# Patient Record
Sex: MALE | Race: WHITE | Employment: OTHER | URBAN - METROPOLITAN AREA
[De-identification: names, ages, dates, MRNs, and addresses within clinical notes are randomized per-mention and may not be internally consistent; named-entity substitution may affect disease eponyms.]

---

## 2021-08-16 ENCOUNTER — TRANSCRIBE ORDER (OUTPATIENT)
Dept: SCHEDULING | Age: 61
End: 2021-08-16

## 2021-08-16 DIAGNOSIS — M50.20 DISPLACEMENT OF CERVICAL INTERVERTEBRAL DISC: Primary | ICD-10-CM

## 2021-10-01 ENCOUNTER — APPOINTMENT (OUTPATIENT)
Dept: CT IMAGING | Age: 61
End: 2021-10-01
Attending: PHYSICIAN ASSISTANT
Payer: MEDICAID

## 2021-10-01 ENCOUNTER — HOSPITAL ENCOUNTER (EMERGENCY)
Age: 61
Discharge: HOME | End: 2021-10-01
Attending: EMERGENCY MEDICINE
Payer: MEDICAID

## 2021-10-01 VITALS
HEIGHT: 70 IN | OXYGEN SATURATION: 100 % | TEMPERATURE: 97.4 F | DIASTOLIC BLOOD PRESSURE: 76 MMHG | SYSTOLIC BLOOD PRESSURE: 135 MMHG | HEART RATE: 56 BPM | BODY MASS INDEX: 21.47 KG/M2 | RESPIRATION RATE: 18 BRPM | WEIGHT: 150 LBS

## 2021-10-01 DIAGNOSIS — S39.012A BACK STRAIN, INITIAL ENCOUNTER: Primary | ICD-10-CM

## 2021-10-01 LAB
ALBUMIN SERPL-MCNC: 4.1 G/DL (ref 3.4–5)
ALBUMIN/GLOB SERPL: 1 {RATIO} (ref 0.8–1.7)
ALP SERPL-CCNC: 68 U/L (ref 45–117)
ALT SERPL-CCNC: 25 U/L (ref 16–61)
ANION GAP SERPL CALC-SCNC: 9 MMOL/L (ref 3–18)
AST SERPL-CCNC: 20 U/L (ref 10–38)
BASOPHILS # BLD: 0.1 K/UL (ref 0–0.1)
BASOPHILS NFR BLD: 0 % (ref 0–2)
BILIRUB SERPL-MCNC: 0.8 MG/DL (ref 0.2–1)
BUN SERPL-MCNC: 17 MG/DL (ref 7–18)
BUN/CREAT SERPL: 28 (ref 12–20)
CALCIUM SERPL-MCNC: 9.8 MG/DL (ref 8.5–10.1)
CHLORIDE SERPL-SCNC: 104 MMOL/L (ref 100–111)
CK SERPL-CCNC: 60 U/L (ref 39–308)
CO2 SERPL-SCNC: 25 MMOL/L (ref 21–32)
CREAT SERPL-MCNC: 0.61 MG/DL (ref 0.6–1.3)
DIFFERENTIAL METHOD BLD: NORMAL
EOSINOPHIL # BLD: 0.1 K/UL (ref 0–0.4)
EOSINOPHIL NFR BLD: 0 % (ref 0–5)
ERYTHROCYTE [DISTWIDTH] IN BLOOD BY AUTOMATED COUNT: 12.3 % (ref 11.6–14.5)
GLOBULIN SER CALC-MCNC: 4.1 G/DL (ref 2–4)
GLUCOSE SERPL-MCNC: 86 MG/DL (ref 74–99)
HCT VFR BLD AUTO: 46.1 % (ref 36–48)
HGB BLD-MCNC: 15.6 G/DL (ref 13–16)
LYMPHOCYTES # BLD: 2.9 K/UL (ref 0.9–3.6)
LYMPHOCYTES NFR BLD: 25 % (ref 21–52)
MCH RBC QN AUTO: 31.3 PG (ref 24–34)
MCHC RBC AUTO-ENTMCNC: 33.8 G/DL (ref 31–37)
MCV RBC AUTO: 92.6 FL (ref 78–100)
MONOCYTES # BLD: 0.8 K/UL (ref 0.05–1.2)
MONOCYTES NFR BLD: 7 % (ref 3–10)
NEUTS SEG # BLD: 7.7 K/UL (ref 1.8–8)
NEUTS SEG NFR BLD: 67 % (ref 40–73)
PLATELET # BLD AUTO: 295 K/UL (ref 135–420)
PMV BLD AUTO: 9.5 FL (ref 9.2–11.8)
POTASSIUM SERPL-SCNC: 4.6 MMOL/L (ref 3.5–5.5)
PROT SERPL-MCNC: 8.2 G/DL (ref 6.4–8.2)
RBC # BLD AUTO: 4.98 M/UL (ref 4.35–5.65)
SODIUM SERPL-SCNC: 138 MMOL/L (ref 136–145)
WBC # BLD AUTO: 11.6 K/UL (ref 4.6–13.2)

## 2021-10-01 PROCEDURE — 82550 ASSAY OF CK (CPK): CPT

## 2021-10-01 PROCEDURE — 72131 CT LUMBAR SPINE W/O DYE: CPT

## 2021-10-01 PROCEDURE — 72128 CT CHEST SPINE W/O DYE: CPT

## 2021-10-01 PROCEDURE — 74011250637 HC RX REV CODE- 250/637: Performed by: PHYSICIAN ASSISTANT

## 2021-10-01 PROCEDURE — 85025 COMPLETE CBC W/AUTO DIFF WBC: CPT

## 2021-10-01 PROCEDURE — 80053 COMPREHEN METABOLIC PANEL: CPT

## 2021-10-01 PROCEDURE — 99282 EMERGENCY DEPT VISIT SF MDM: CPT

## 2021-10-01 PROCEDURE — 72125 CT NECK SPINE W/O DYE: CPT

## 2021-10-01 PROCEDURE — 74011250636 HC RX REV CODE- 250/636: Performed by: PHYSICIAN ASSISTANT

## 2021-10-01 PROCEDURE — 70450 CT HEAD/BRAIN W/O DYE: CPT

## 2021-10-01 RX ORDER — OXYCODONE AND ACETAMINOPHEN 7.5; 325 MG/1; MG/1
TABLET ORAL
Status: COMPLETED | OUTPATIENT
Start: 2021-10-01 | End: 2021-10-01

## 2021-10-01 RX ADMIN — SODIUM CHLORIDE 1000 ML: 900 INJECTION, SOLUTION INTRAVENOUS at 17:00

## 2021-10-01 RX ADMIN — OXYCODONE HYDROCHLORIDE AND ACETAMINOPHEN 1 TABLET: 7.5; 325 TABLET ORAL at 17:08

## 2021-10-01 NOTE — ED PROVIDER NOTES
EMERGENCY DEPARTMENT HISTORY AND PHYSICAL EXAM    Date: 10/1/2021  Patient Name: Arnulfo Reece. History of Presenting Illness     Chief Complaint   Patient presents with    Back Pain    Extremity Weakness     all 4         History Provided By: Patient    Chief Complaint: back pain       Additional History (Context):   3:05 PM  Arnulfo Devries is a 63 y.o. male with PMHX hepatitis C, chronic back pain, chronic pain to hips and legs, DJD presents to the emergency department C/O fall. Patient states he was walking a dog a couple days ago and it pulled causing him to get knocked down. States the dog dragged him a fair ways. He did hit his head but no loss of consciousness. States his entire back is been hurting him. Neck hurts and has some tingling in the bilateral arms. States he has tingling and numbness with pain in the bilateral lower extremities as well no abdominal pain or chest pain. No bowel or bladder incontinence. No groin numbness. States he was supposed to get an MRI of his neck. Chart review shows that he has a long history of back problems and is followed in [facility]. He appears to be on chronic pain management    PCP: Ladarius Zavala MD    Current Outpatient Medications   Medication Sig Dispense Refill    oxyCODONE IR (ROXICODONE) 5 mg immediate release tablet Take 8 Tabs by mouth every four (4) hours as needed for Pain (may take every 3 hrs as neeed for pain). 240 Tab 0    diazepam (VALIUM) 10 mg tablet Take 1 Tab by mouth every six (6) hours as needed (muscle spasm). 120 Tab 0    fentaNYL (DURAGESIC) 50 mcg/hr PATCH 1 Patch by TransDERmal route every seventy-two (72) hours. 5 Patch 5    nortriptyline (PAMELOR) 10 mg capsule Take  by mouth nightly.  fentaNYL (DURAGESIC) 50 mcg/hr PATCH 1 Patch by TransDERmal route every seventy-two (72) hours. Fill on or after 08/08/12.  For chronic pain 10 Patch 0    fentaNYL (DURAGESIC) 50 mcg/hr PATCH 1 Patch by TransDERmal route every fourty-eight (48) hours for 30 days. For chronic pain  FILL ON OR AFTER:  07/19/12  Indications: CHRONIC PAIN WITH OPIOID TOLERANCE 15 Patch 0    OTHER Take 2 DOSES by mouth three (3) times daily. Whey protein      multivitamin (ONE A DAY) tablet Take 1 Tab by mouth daily.  diazepam (VALIUM) 10 mg tablet Take 1 Tab by mouth every six (6) hours as needed (muscle spasm / back pain). 80 Tab 0       Past History     Past Medical History:  Past Medical History:   Diagnosis Date    Arthritis     osteo    Back pain with radiation     bilateral radiation    Chronic pain     back,hip,legs    DDD (degenerative disc disease), lumbar     Hepatitis C     Liver disease     Hep C; Received therapy [id number] & Virus undetectable now    Lumbar radiculitis        Past Surgical History:  Past Surgical History:   Procedure Laterality Date    HX BACK SURGERY  2010    Anterior Lumbar Interbody Fusion- dr Perez [facility] BACK SURGERY  8/8/11    Lumbar Laminectomy, fusion    HX OTHER SURGICAL  2001    Liver Biopsy       Family History:  Family History   Problem Relation Age of Onset    Arthritis-osteo Mother     Diabetes Father     Heart Attack Father     Heart Disease Father     Diabetes Paternal Uncle        Social History:  Social History     Tobacco Use    Smoking status: Current Every Day Smoker     Packs/day: 0.25     Years: 30.00     Pack years: 7.50     Types: Cigarettes    Smokeless tobacco: Never Used   Substance Use Topics    Alcohol use: Yes     Comment: socially - less than 1 drink per day    Drug use: Yes     Types: Prescription       Allergies:  No Known Allergies    Review of Systems   Review of Systems   Constitutional: Negative for chills and fever. Eyes: Negative for visual disturbance. Respiratory: Negative for shortness of breath. Cardiovascular: Negative for chest pain. Gastrointestinal: Negative for abdominal pain, constipation, diarrhea, nausea and vomiting. Musculoskeletal: Positive for back pain and neck pain. Negative for joint swelling. Skin: Negative for wound. Neurological: Negative for dizziness, syncope, facial asymmetry, speech difficulty, weakness, light-headedness, numbness and headaches. All other systems reviewed and are negative. Physical Exam     Vitals:    10/01/21 1452   BP: 135/76   Pulse: (!) 56   Resp: 18   Temp: 97.4 °F (36.3 °C)   SpO2: 100%   Weight: 68 kg (150 lb)   Height: 5' 10\" (1.778 m)     Physical Exam  Vitals and nursing note reviewed. Constitutional:       Appearance: He is well-developed. Comments: Alert, very thin male, lying on stretcher, appears uncomfortable, able to ambulate    HENT:      Head: Normocephalic and atraumatic. Comments: No bull Signs or raccoon eyes     Right Ear: Tympanic membrane normal.      Left Ear: Tympanic membrane normal.      Nose: Nose normal.      Mouth/Throat:      Mouth: Mucous membranes are moist.   Eyes:      Extraocular Movements: Extraocular movements intact. Pupils: Pupils are equal, round, and reactive to light. Cardiovascular:      Rate and Rhythm: Normal rate and regular rhythm. Heart sounds: Normal heart sounds. No murmur heard. Pulmonary:      Effort: Pulmonary effort is normal. No respiratory distress. Breath sounds: Normal breath sounds. No wheezing or rales. Abdominal:      General: Bowel sounds are normal.      Palpations: Abdomen is soft. Tenderness: There is no abdominal tenderness. Genitourinary:     Comments: Good rectal tone   Musculoskeletal:         General: Normal range of motion. Cervical back: Normal range of motion and neck supple. Comments: Several old healed scars along the L-spine, entire spine tender to palpation, he does have full range of motion to all extremities strength 5 out of 5 in all extremities and sensation intact.   Abdomen is nontender no distention guarding or rebound, chest wall nontender   Skin: General: Skin is warm and dry. Neurological:      General: No focal deficit present. Mental Status: He is alert and oriented to person, place, and time. Cranial Nerves: Cranial nerves are intact. Sensory: Sensation is intact. No sensory deficit. Motor: Motor function is intact. No weakness. Deep Tendon Reflexes:      Reflex Scores:       Tricep reflexes are 2+ on the right side and 2+ on the left side. Patellar reflexes are 2+ on the right side and 2+ on the left side. Psychiatric:         Judgment: Judgment normal.         Diagnostic Study Results     Labs:     Recent Results (from the past 12 hour(s))   CBC WITH AUTOMATED DIFF    Collection Time: 10/01/21  4:30 PM   Result Value Ref Range    WBC 11.6 4.6 - 13.2 K/uL    RBC 4.98 4.35 - 5.65 M/uL    HGB 15.6 13.0 - 16.0 g/dL    HCT 46.1 36.0 - 48.0 %    MCV 92.6 78.0 - 100.0 FL    MCH 31.3 24.0 - 34.0 PG    MCHC 33.8 31.0 - 37.0 g/dL    RDW 12.3 11.6 - 14.5 %    PLATELET 561 836 - 350 K/uL    MPV 9.5 9.2 - 11.8 FL    NEUTROPHILS 67 40 - 73 %    LYMPHOCYTES 25 21 - 52 %    MONOCYTES 7 3 - 10 %    EOSINOPHILS 0 0 - 5 %    BASOPHILS 0 0 - 2 %    ABS. NEUTROPHILS 7.7 1.8 - 8.0 K/UL    ABS. LYMPHOCYTES 2.9 0.9 - 3.6 K/UL    ABS. MONOCYTES 0.8 0.05 - 1.2 K/UL    ABS. EOSINOPHILS 0.1 0.0 - 0.4 K/UL    ABS. BASOPHILS 0.1 0.0 - 0.1 K/UL    DF AUTOMATED     METABOLIC PANEL, COMPREHENSIVE    Collection Time: 10/01/21  4:30 PM   Result Value Ref Range    Sodium 138 136 - 145 mmol/L    Potassium 4.6 3.5 - 5.5 mmol/L    Chloride 104 100 - 111 mmol/L    CO2 25 21 - 32 mmol/L    Anion gap 9 3.0 - 18 mmol/L    Glucose 86 74 - 99 mg/dL    BUN 17 7.0 - 18 MG/DL    Creatinine 0.61 0.6 - 1.3 MG/DL    BUN/Creatinine ratio 28 (H) 12 - 20      GFR est AA >60 >60 ml/min/1.73m2    GFR est non-AA >60 >60 ml/min/1.73m2    Calcium 9.8 8.5 - 10.1 MG/DL    Bilirubin, total 0.8 0.2 - 1.0 MG/DL    ALT (SGPT) 25 16 - 61 U/L    AST (SGOT) 20 10 - 38 U/L    Alk. phosphatase 68 45 - 117 U/L    Protein, total 8.2 6.4 - 8.2 g/dL    Albumin 4.1 3.4 - 5.0 g/dL    Globulin 4.1 (H) 2.0 - 4.0 g/dL    A-G Ratio 1.0 0.8 - 1.7     CK    Collection Time: 10/01/21  4:30 PM   Result Value Ref Range    CK 60 39 - 308 U/L       Radiologic Studies:   CT SPINE LUMB WO CONT   Final Result      No evidence of acute fracture or traumatic subluxation. Posterior lumbar spinal fusion as above. Advanced L3-4 and L4-L5 spondylosis. CT SPINE [facility] WO CONT   Final Result      No evidence of acute fracture or traumatic subluxation. Posterior lumbar spinal fusion as above. Advanced L3-4 and L4-L5 spondylosis. CT SPINE CERV WO CONT   Final Result         1. No evidence of fracture or acute traumatic listhesis involving the cervical   spine. 2. Multilevel cervical spondylosis without evidence of high-grade osseous canal   stenosis. 3. Uncovertebral and facet joint arthrosis as above. CT HEAD WO CONT   Final Result         1. No acute intracranial abnormality. 2. Mild periventricular white matter low-attenuation, as can be seen with   chronic ischemic microvascular change. CT Results  (Last 48 hours)               10/01/21 1554  CT SPINE LUMB WO CONT Final result    Impression:      No evidence of acute fracture or traumatic subluxation. Posterior lumbar spinal fusion as above. Advanced L3-4 and L4-L5 spondylosis. Narrative:  EXAM: CT thoracic and lumbar spine       INDICATION: Thoracic and lumbar Pain       COMPARISON: No prior study. TECHNIQUE: Axial CT imaging of the thoracic and lumbar spine spine was performed   without intravenous contrast. Multiplanar reformats were generated. One or more dose reduction techniques were used on this CT: automated exposure   control, adjustment of the mAs and/or kVp according to patient size, and   iterative reconstruction techniques.   The specific techniques used on this CT   exam have been documented in the patient's electronic medical record. Digital   Imaging and Communications in Medicine (DICOM) format image data are available   to nonaffiliated external healthcare facilities or entities on a secure, media   free, reciprocally searchable basis with patient authorization for at least a   12-month period after this study. _______________       FINDINGS:       VERTEBRAE AND DISCS: Minimal retrolisthesis of L3 on L4. Otherwise cervical   alignment is normal. No evidence of acute fracture or traumatic subluxation. Prior posterior spinal fusion of L2-L3 with bilateral paired rods and screws. Prior anterior spinal fusion of L5-S1. Advanced L3-L4 and L4-L5 spondylosis with   complete disc space narrowing and endplate erosions and ventral osteophytes. Vacuum phenomenon associated L3-L4 L4-L5. Multilevel posterior facet arthrosis. No area of erosion or aggressive-appearing bone destruction. SPINAL CANAL AND FORAMINA: No high grade spinal canal or foramina stenosis is   seen. PREVERTEBRAL SOFT TISSUES: Normal       OTHER: None.       _______________           10/01/21 1552  CT SPINE THORAC WO CONT Final result    Impression:      No evidence of acute fracture or traumatic subluxation. Posterior lumbar spinal fusion as above. Advanced L3-4 and L4-L5 spondylosis. Narrative:  EXAM: CT thoracic and lumbar spine       INDICATION: Thoracic and lumbar Pain       COMPARISON: No prior study. TECHNIQUE: Axial CT imaging of the thoracic and lumbar spine spine was performed   without intravenous contrast. Multiplanar reformats were generated. One or more dose reduction techniques were used on this CT: automated exposure   control, adjustment of the mAs and/or kVp according to patient size, and   iterative reconstruction techniques. The specific techniques used on this CT   exam have been documented in the patient's electronic medical record.   Digital Imaging and Communications in Medicine (DICOM) format image data are available   to nonaffiliated external healthcare facilities or entities on a secure, media   free, reciprocally searchable basis with patient authorization for at least a   12-month period after this study. _______________       FINDINGS:       VERTEBRAE AND DISCS: Minimal retrolisthesis of L3 on L4. Otherwise cervical   alignment is normal. No evidence of acute fracture or traumatic subluxation. Prior posterior spinal fusion of L2-L3 with bilateral paired rods and screws. Prior anterior spinal fusion of L5-S1. Advanced L3-L4 and L4-L5 spondylosis with   complete disc space narrowing and endplate erosions and ventral osteophytes. Vacuum phenomenon associated L3-L4 L4-L5. Multilevel posterior facet arthrosis. No area of erosion or aggressive-appearing bone destruction. SPINAL CANAL AND FORAMINA: No high grade spinal canal or foramina stenosis is   seen. PREVERTEBRAL SOFT TISSUES: Normal       OTHER: None.       _______________           10/01/21 1551  CT SPINE CERV WO CONT Final result    Impression:          1. No evidence of fracture or acute traumatic listhesis involving the cervical   spine. 2. Multilevel cervical spondylosis without evidence of high-grade osseous canal   stenosis. 3. Uncovertebral and facet joint arthrosis as above. Narrative:  EXAM: CT Cervical spine       INDICATION: Cervical neck pain after fall       COMPARISON: No prior study. TECHNIQUE: Axial CT imaging of the cervical spine was performed from the skull   base to the upper thoracic spine without intravenous contrast. Multiplanar   reformats were generated. One or more dose reduction techniques were used on this CT: automated exposure   control, adjustment of the mAs and/or kVp according to patient size, and   iterative reconstruction techniques.   The specific techniques used on this CT   exam have been documented in the patient's electronic medical record. Digital   Imaging and Communications in Medicine (DICOM) format image data are available   to nonaffiliated external healthcare facilities or entities on a secure, media   free, reciprocally searchable basis with patient authorization for at least a   12-month period after this study. _______________       FINDINGS:       VERTEBRAE AND DISCS: Intact and normal appearance to the occipital condyles. There is reversal of usual cervical lordosis without evidence of listhesis. Vertebral body heights are preserved. Diffuse spondylosis, greatest across the   C3-C7 segments. No displaced fracture. No aggressive appearing osseous lesion. SPINAL CANAL AND FORAMINA: No high-grade osseous canal stenosis. Multilevel   uncovertebral and facet joint osteoarthrosis with areas of moderate   neuroforaminal narrowing, bilaterally at C5-C6. PREVERTEBRAL SOFT TISSUES: No abnormal prevertebral soft tissue swelling. VISIBLE INTRACRANIAL CONTENTS: Included intracranial contents demonstrate no   acute abnormality. LUNG APICES: Clear. OTHER: None.       _______________           10/01/21 1551  CT HEAD WO CONT Final result    Impression:          1. No acute intracranial abnormality. 2. Mild periventricular white matter low-attenuation, as can be seen with   chronic ischemic microvascular change. Narrative:  EXAM: CT head       INDICATION: Trauma, headache following fall       COMPARISON: None. TECHNIQUE: Axial CT imaging of the head was performed without intravenous   contrast. Standard multiplanar coronal and sagittal reformatted images were   obtained and are included in interpretation. One or more dose reduction techniques were used on this CT: automated exposure   control, adjustment of the mAs and/or kVp according to patient size, and   iterative reconstruction techniques.   The specific techniques used on this CT   exam have been documented in the patient's electronic medical record. Digital   Imaging and Communications in Medicine (DICOM) format image data are available   to nonaffiliated external healthcare facilities or entities on a secure, media   free, reciprocally searchable basis with patient authorization for at least a   12-month period after this study. _______________       FINDINGS:       BRAIN AND POSTERIOR FOSSA: The sulci, folia, ventricles and basal cisterns are   within normal limits for the patient?s age. There is no intracranial hemorrhage,   mass effect, or midline shift. The gray-white matter differentiation appears   within normal limits. Minor periventricular white matter low-attenuation. EXTRA-AXIAL SPACES AND MENINGES: There are no abnormal extra-axial fluid   collections. CALVARIUM: Intact. SINUSES: Clear. OTHER: None.       _______________               CXR Results  (Last 48 hours)    None          Medical Decision Making   I am the first provider for this patient. I reviewed the vital signs, available nursing notes, past medical history, past surgical history, family history and social history. Vital Signs: Reviewed the patient's vital signs. Pulse Oximetry Analysis: 100% on Ra       Records Reviewed: Nursing Notes and Old Medical Records    Procedures:  Procedures    ED Course:   3:05 PM Initial assessment performed. The patients presenting problems have been discussed, and they are in agreement with the care plan formulated and outlined with them. I have encouraged them to ask questions as they arise throughout their visit. Case discussed with ED attending Kathy Villanueva, agrees with plan. Discussion:  Pt presents with long history of back problems and surgeries presents after he was pulled by a dog while walking it causing him to hit his head and has now had pain from his neck all the way down his back. States that he has pain into all 4 extremities.   He does have strength 5 out of 5 in all extremities with intact reflexes. He has no abdominal pain or chest pain. Good rectal tone. No red flag signs or symptoms. He does have a spinal surgeon that he sees and manages his pain chronically will have him follow-up with their service. . Strict return precautions given, pt offering no questions or complaints. Diagnosis and Disposition     DISCHARGE NOTE:  Liz Oliveira Jr.'s  results have been reviewed with him. He has been counseled regarding his diagnosis, treatment, and plan. He verbally conveys understanding and agreement of the signs, symptoms, diagnosis, treatment and prognosis and additionally agrees to follow up as discussed. He also agrees with the care-plan and conveys that all of his questions have been answered. I have also provided discharge instructions for him that include: educational information regarding their diagnosis and treatment, and list of reasons why they would want to return to the ED prior to their follow-up appointment, should his condition change. He has been provided with education for proper emergency department utilization. CLINICAL IMPRESSION:    1. Back strain, initial encounter        PLAN:  1. D/C Home  2. Discharge Medication List as of 10/1/2021  6:44 PM        3. Follow-up Information     Follow up With Specialties Details Why Contact Info    Kal Menendez MD Family Medicine Schedule an appointment as soon as possible for a visit   [address]      [facility] EMERGENCY DEPT Emergency Medicine  If symptoms worsen [address] [facility] [place] [address]  [phone]             Please note that this dictation was completed with "[email or web address] the computer voice recognition software. Quite often unanticipated grammatical, syntax, homophones, and other interpretive errors are inadvertently transcribed by the computer software. Please disregard these errors.   Please excuse any errors that have escaped final proofreading.

## 2021-10-01 NOTE — ED NOTES
Pt resting comfortably in bed at this time. Pt hasd IVF infusing at this time. Pt has blanket and resting with eyes closed at this time.

## 2021-10-01 NOTE — ED TRIAGE NOTES
Pt in for c/o back pain and weakness in bl hands and feet after he fell while walking dog.  Pt reports hx of degenerative disc disease

## 2022-06-10 ENCOUNTER — TRANSCRIBE ORDER (OUTPATIENT)
Dept: SCHEDULING | Age: 62
End: 2022-06-10

## 2022-06-10 DIAGNOSIS — M17.12 UNILATERAL PRIMARY OSTEOARTHRITIS, LEFT KNEE: ICD-10-CM

## 2022-06-10 DIAGNOSIS — M50.20 OTHER CERVICAL DISC DISPLACEMENT, UNSPECIFIED CERVICAL REGION: Primary | ICD-10-CM

## 2022-06-10 DIAGNOSIS — M51.26 OTHER INTERVERTEBRAL DISC DISPLACEMENT, LUMBAR REGION: ICD-10-CM

## 2022-06-24 ENCOUNTER — HOSPITAL ENCOUNTER (OUTPATIENT)
Dept: MRI IMAGING | Age: 62
Discharge: HOME | End: 2022-06-24
Payer: MEDICAID

## 2022-06-24 ENCOUNTER — HOSPITAL ENCOUNTER (OUTPATIENT)
Dept: MRI IMAGING | Age: 62
End: 2022-06-24
Payer: MEDICAID

## 2022-06-24 DIAGNOSIS — M51.26 OTHER INTERVERTEBRAL DISC DISPLACEMENT, LUMBAR REGION: ICD-10-CM

## 2022-06-24 DIAGNOSIS — M50.20 OTHER CERVICAL DISC DISPLACEMENT, UNSPECIFIED CERVICAL REGION: ICD-10-CM

## 2022-06-24 PROCEDURE — 72148 MRI LUMBAR SPINE W/O DYE: CPT

## 2022-06-24 PROCEDURE — 72141 MRI NECK SPINE W/O DYE: CPT

## 2022-08-19 ENCOUNTER — TRANSCRIBE ORDER (OUTPATIENT)
Dept: SCHEDULING | Age: 62
End: 2022-08-19

## 2022-08-19 DIAGNOSIS — S83.207A UNSPECIFIED TEAR OF UNSPECIFIED MENISCUS, CURRENT INJURY, LEFT KNEE, INITIAL ENCOUNTER: Primary | ICD-10-CM

## 2023-01-31 DIAGNOSIS — M17.12 UNILATERAL PRIMARY OSTEOARTHRITIS, LEFT KNEE: Primary | ICD-10-CM

## 2023-02-03 DIAGNOSIS — M17.12 UNILATERAL PRIMARY OSTEOARTHRITIS, LEFT KNEE: Primary | ICD-10-CM

## 2023-04-21 DIAGNOSIS — S83.207A UNSPECIFIED TEAR OF UNSPECIFIED MENISCUS, CURRENT INJURY, LEFT KNEE, INITIAL ENCOUNTER: Primary | ICD-10-CM
